# Patient Record
Sex: MALE | Race: WHITE | NOT HISPANIC OR LATINO | Employment: FULL TIME | URBAN - METROPOLITAN AREA
[De-identification: names, ages, dates, MRNs, and addresses within clinical notes are randomized per-mention and may not be internally consistent; named-entity substitution may affect disease eponyms.]

---

## 2021-05-26 ENCOUNTER — RECORDS - HEALTHEAST (OUTPATIENT)
Dept: ADMINISTRATIVE | Facility: CLINIC | Age: 41
End: 2021-05-26

## 2022-04-13 ENCOUNTER — HOSPITAL ENCOUNTER (EMERGENCY)
Facility: HOSPITAL | Age: 42
Discharge: HOME | End: 2022-04-13
Attending: EMERGENCY MEDICINE | Admitting: EMERGENCY MEDICINE
Payer: COMMERCIAL

## 2022-04-13 VITALS
DIASTOLIC BLOOD PRESSURE: 91 MMHG | WEIGHT: 185 LBS | RESPIRATION RATE: 18 BRPM | HEIGHT: 67 IN | OXYGEN SATURATION: 98 % | HEART RATE: 74 BPM | TEMPERATURE: 96.9 F | SYSTOLIC BLOOD PRESSURE: 164 MMHG | BODY MASS INDEX: 29.03 KG/M2

## 2022-04-13 DIAGNOSIS — M25.561 ACUTE PAIN OF RIGHT KNEE: ICD-10-CM

## 2022-04-13 PROBLEM — G47.00 INSOMNIA: Status: ACTIVE | Noted: 2021-05-07

## 2022-04-13 PROBLEM — F10.99 ALCOHOL RELATED DISORDER (H): Status: ACTIVE | Noted: 2019-02-15

## 2022-04-13 PROCEDURE — 250N000013 HC RX MED GY IP 250 OP 250 PS 637: Performed by: EMERGENCY MEDICINE

## 2022-04-13 PROCEDURE — 99283 EMERGENCY DEPT VISIT LOW MDM: CPT

## 2022-04-13 RX ORDER — TRAMADOL HYDROCHLORIDE 50 MG/1
50 TABLET ORAL ONCE
Status: COMPLETED | OUTPATIENT
Start: 2022-04-13 | End: 2022-04-13

## 2022-04-13 RX ORDER — TRAMADOL HYDROCHLORIDE 50 MG/1
50 TABLET ORAL EVERY 6 HOURS PRN
Qty: 10 TABLET | Refills: 0 | Status: SHIPPED | OUTPATIENT
Start: 2022-04-13 | End: 2022-04-16

## 2022-04-13 RX ADMIN — TRAMADOL HYDROCHLORIDE 50 MG: 50 TABLET, COATED ORAL at 21:09

## 2022-04-14 NOTE — DISCHARGE INSTRUCTIONS
Use the knee immobilizer to avoid excessive movement.  Ice and elevate the knee to help with discomfort.  Continue with your prednisone.  After prednisone is completed take ibuprofen.  Take tramadol for additional pain relief.  Follow-up with [facility] in 1 to 2 days if not improving.

## 2022-04-14 NOTE — ED PROVIDER NOTES
"EMERGENCY DEPARTMENT ENCOUNTER      NAME: Can Pop  AGE: 42 year old male  YOB: 1980  MRN: [id number]  EVALUATION DATE & TIME: 4/13/2022  8:42 PM    PCP: No primary care provider on file.    ED PROVIDER: Evans Albrecht M.D.      Chief Complaint   Patient presents with     Knee Pain         FINAL IMPRESSION:  1. Acute pain of right knee          ED COURSE & MEDICAL DECISION MAKING:    Pertinent Labs & Imaging studies reviewed. (See chart for details)  42 year old male presents to the Emergency Department for evaluation of right knee pain.  Describes a severe achiness.  Localizes it posterior aspect of the knee.  States had sudden onset of the pain while trying to get out of a chair.  Relates this is been ongoing for a while.  Reports seeing [facility] a few weeks ago where an MRI was performed.  He was started on prednisone after only \"a few areas of curiosity were noted\".  No reports of ligamentous or cartilaginous injuries.  On exam the knee is without effusion, warmth or erythema.  No joint line tenderness.  No ligamentous laxity.  Slight tenderness posteriorly along the popliteus and along the insertions of the hamstring muscles.  Patella in normal position.  Patient reassured that findings inconsistent with knee dislocation.  Possibility of cartilaginous injury discussed for a while until patient reported his MRI findings making meniscal injury unlikely.  Patient reassured in regards of findings.  Given to tramadol for discomfort.  Crutches for symptomatic relief.  Follow-up with [facility] in 2 to 3 days if not improving.. Patient appears non toxic with stable vitals signs.       8:50 PM I met with the patient for the initial interview and physical examination. Discussed plan for treatment and workup in the ED.    9:24 PM.  Patient declined knee immobilizer.  At the conclusion of the encounter I discussed the results of all of the tests and the disposition. The questions " "were answered and return precautions provided. The patient or family acknowledged understanding and was agreeable with the care plan.       PPE: Provider wore gloves, N95 mask, eye protection, surgical cap.     MEDICATIONS GIVEN IN THE EMERGENCY:  Medications   traMADol (ULTRAM) tablet 50 mg (has no administration in time range)       NEW PRESCRIPTIONS STARTED AT TODAY'S ER VISIT  New Prescriptions    TRAMADOL (ULTRAM) 50 MG TABLET    Take 1 tablet (50 mg) by mouth every 6 hours as needed for severe pain          =================================================================    HPI    Patient information was obtained from: the patient    Use of Intrepreter: N/A       Can Pop is a 42 year old male with a limited medical history of anxiety, depression, and insomnia, who presents to the ED for evaluation of knee pain.    For the past few months, the patient has had a popping sensation in his right knee that causes severe pain. He states that he is usually able to make his knee feel better by flexing and extending it repeatedly until it \"pops back in.\" About 2.5 weeks ago, the patient was seen at [facility] urgent care and an MRI was done. Following this imaging, he was put on prednisone and instructed to follow up in two months. Today, he did not have any issues with his knee until 1.5 hours ago. He was sitting and twisted his knee, when he had a sudden onset of severe pain. He is unable to stand or put weight on his right knee secondary to pain. This pain is located mostly on the back of his knee. In order to alleviate his symptoms, he took two ibuprofen tonight without relief.       REVIEW OF SYSTEMS   Constitutional:  Denies fever, chills  Respiratory:  Denies productive cough or increased work of breathing  Cardiovascular:  Denies chest pain, palpitations  GI:  Denies abdominal pain, nausea, vomiting, or change in bowel or bladder habits   Musculoskeletal:  Positive for right knee pain.  Skin:  Denies rash " "  Neurologic:  Denies focal weakness  All systems negative except as marked.     PAST MEDICAL HISTORY:  History reviewed. No pertinent past medical history.    PAST SURGICAL HISTORY:  Past Surgical History:   Procedure Laterality Date     HAND SURGERY           CURRENT MEDICATIONS:      Current Facility-Administered Medications:      traMADol (ULTRAM) tablet 50 mg, 50 mg, Oral, Once, Evans Albrecht MD    Current Outpatient Medications:      traMADol (ULTRAM) 50 MG tablet, Take 1 tablet (50 mg) by mouth every 6 hours as needed for severe pain, Disp: 10 tablet, Rfl: 0    ALLERGIES:  No Known Allergies    FAMILY HISTORY:  History reviewed. No pertinent family history.    SOCIAL HISTORY:   Social History     Socioeconomic History     Marital status:      Spouse name: None     Number of children: None     Years of education: None     Highest education level: None   Tobacco Use     Smoking status: Never Smoker     Smokeless tobacco: Never Used   Substance and Sexual Activity     Alcohol use: Yes     Comment: social     Drug use: Not Currently       VITALS:  Patient Vitals for the past 24 hrs:   BP Temp Temp src Pulse Resp SpO2 Height Weight   04/13/22 2035 (!) 162/100 96.9  F (36.1  C) Axillary 95 18 97 % 1.702 m (5' 7\") 83.9 kg (185 lb)        PHYSICAL EXAM    Constitutional:  Awake, alert, in mild apparent distress  HENT:  Normocephalic, Atraumatic. Bilateral external ears normal. Oropharynx moist. Nose normal. Neck- Normal range of motion with no guarding, No midline cervical tenderness, Supple, No stridor.   Eyes:  PERRL, EOMI with no signs of entrapment, Conjunctiva normal, No discharge.   Musculoskeletal:  Intact distal pulses, No edema. Good range of motion in all major joints.  Right knee without effusion, without warmth, No joint line tenderness, good flexion and extension, Slight tenderness at insertions of hamstrings.  Integument:  Warm, Dry, No erythema, No rash.   Neurologic:  Alert & oriented, " Normal motor function, Normal sensory function, No focal deficits noted.   Psychiatric:  Affect normal, Judgment normal, Mood normal.       I, Etelvina Teresa, am serving as a scribe to document services personally performed by Evans Albrecht MD, based on my observation and the provider's statements to me. I, Evans Albrecht MD attest that Etelvina Moore is acting in a scribe capacity, has observed my performance of the services and has documented them in accordance with my direction.    Evans Albrecht M.D.  Emergency Medicine  [facility] EMERGENCY DEPARTMENT     Evans Albrecht MD  04/13/22 2124

## 2022-04-14 NOTE — ED TRIAGE NOTES
"Pt presents with right knee pain. Pt states that his knee \"dislocates\" and has done it multiple times. Pt was seen at [facility] and they did scans and an injection. Today while sitting in a chair it happened again. Unable to bear weight.  "

## 2023-12-24 ENCOUNTER — HOSPITAL ENCOUNTER (EMERGENCY)
Facility: CLINIC | Age: 43
Discharge: HOME | End: 2023-12-24
Attending: EMERGENCY MEDICINE | Admitting: EMERGENCY MEDICINE
Payer: COMMERCIAL

## 2023-12-24 VITALS
OXYGEN SATURATION: 96 % | HEIGHT: 66 IN | HEART RATE: 106 BPM | WEIGHT: 185 LBS | SYSTOLIC BLOOD PRESSURE: 155 MMHG | TEMPERATURE: 97.7 F | BODY MASS INDEX: 29.73 KG/M2 | DIASTOLIC BLOOD PRESSURE: 101 MMHG | RESPIRATION RATE: 17 BRPM

## 2023-12-24 DIAGNOSIS — F10.920 ALCOHOLIC INTOXICATION WITHOUT COMPLICATION (H): ICD-10-CM

## 2023-12-24 PROCEDURE — 99282 EMERGENCY DEPT VISIT SF MDM: CPT

## 2023-12-24 ASSESSMENT — ACTIVITIES OF DAILY LIVING (ADL)
ADLS_ACUITY_SCORE: 35
ADLS_ACUITY_SCORE: 35

## 2023-12-24 ASSESSMENT — ENCOUNTER SYMPTOMS: ABDOMINAL PAIN: 0

## 2023-12-24 NOTE — ED NOTES
EMERGENCY DEPARTMENT SIGN OUT NOTE        ED COURSE AND MEDICAL DECISION MAKING  Patient was signed out to me by Dr Keeley Nielsen at 3:12 AM      In brief, Can Pop is a 43 year old male who initially presented with alcohol intoxication. The patient was brought in by [place] Police for disturbance at his girlfriends House. He has no other medical complaints or concerns.     At time of sign out, discharge was pending a ride home.    Additional ED Course Timeline:  0509-I introduced myself to the patient.  Multiple ED staff have had 2 coworkers patient to stay in the emergency department in his room.  0550-patient's ride is in the ED.    I, Lowell Rivera, am serving as a scribe to document services personally performed by Mayelin Archuleta MD based on my observation and the provider's statements to me. I, Mayelin Archuleta MD attest that Lowell Rivera is acting in a scribe capacity, has observed my performance of the services and has documented them in accordance with my direction.      FINAL IMPRESSION    1. Alcoholic intoxication without complication (H24)          Mayelin Archuleta MD  [facility] EMERGENCY ROOM  [address] [facility] [address] [phone]       Mayelin Archuleta MD  12/24/23 4658

## 2023-12-24 NOTE — ED PROVIDER NOTES
EMERGENCY DEPARTMENT ENCOUNTER      NAME: Can Pop  AGE: 43 year old male  YOB: 1980  MRN: [id number]  EVALUATION DATE & TIME: 12/24/2023  1:47 AM    PCP: No Ref-Primary, Physician    ED PROVIDER: Evelyn Nielsen M.D.      Chief Complaint   Patient presents with    Alcohol Intoxication     FINAL IMPRESSION:  1. Alcoholic intoxication without complication (H24)        MEDICAL DECISION MAKING:    Pertinent Labs & Imaging studies reviewed. (See chart for details)  ED Course as of 12/24/23 0305   Sun Dec 24, 2023   0200 Afebrile.  Vital signs here unremarkable.  Patient is coming in with alcohol intoxication.  At his girlfriend's house.  Apparently she and he had both been drinking.  They got into an argument.  Girlfriend called the police.  Police brought him into the hospital.  He has no complaints.  Police did not report there was any trauma or any other injury.  No concerns for mental health.  Exam for patient here intoxicated but cooperative.  Mildly tachycardic.  Denies suicidal or homicidal ideation.    Patient will see if he can find a ride home.  He has no medical complaints at this time.   0303 Patient is clinically sober, ambulating around the emergency department without difficulty.  Is attempting to find a ride.  At this point given the fact that patient clinically sober at this time with steady ambulation I would be in with a left home for him.  He is currently trying to call a ride share raegan.  Plan for discharge with a ride home.         Medical Decision Making    History:  Supplemental history from: Documented in chart, if applicable and Law Enforcement  External Record(s) reviewed: Documented in chart, if applicable.    Work Up:  Chart documentation includes differential considered and any EKGs or imaging independently interpreted by provider, where specified.  In additional to work up documented, I considered the following work up: Documented in chart, if applicable.    External  consultation:  Discussion of management with another provider: Documented in chart, if applicable    Complicating factors:  Care impacted by chronic illness: N/A  Care affected by social determinants of health: N/A    Disposition considerations: Discharge. No recommendations on prescription strength medication(s). See documentation for any additional details.          Critical care: 0 minutes excluding separately billable procedures.  Includes bedside management, time reviewing test results, review of records, discussing the case with staff, documenting the medical record and time spent with family members (or surrogate decision makers) discussing specific treatment issues.          ED COURSE:  1:53 AM I met with the patient, obtained history, performed an initial exam, and discussed options and plan for diagnostics and treatment here in the ED.     The importance of close follow up was discussed. We reviewed warning signs and symptoms, and I instructed Mr. Pop to return to the emergency department immediately if he develops any new or worsening symptoms. I provided additional verbal discharge instructions. Mr. Pop expressed understanding and agreement with this plan of care, his questions were answered, and he was discharged in stable condition.     MEDICATIONS GIVEN IN THE EMERGENCY:  Medications - No data to display    NEW PRESCRIPTIONS STARTED AT TODAY'S ER VISIT:  New Prescriptions    No medications on file          =================================================================    HPI    Patient information was obtained from: Patient    Use of : N/A     Can Pop is a 43 year old male with a history of alcohol related disorder who presents, via police with alcohol intoxication.    The patient reports here with police after patient was reportedly at his girlfriend's house this morning and they got into an argument.  The patient and his girlfriend were both reportedly drinking.  The  "patient has no complaints at this time and states \"I would like to go home and go to bed\".  No other complaints at this time.  No homicidal or suicidal ideas.    REVIEW OF SYSTEMS   Review of Systems   Cardiovascular:  Negative for chest pain.   Gastrointestinal:  Negative for abdominal pain.   Psychiatric/Behavioral:  Negative for suicidal ideas.         Negative for homicidal ideas   All other systems reviewed and are negative.    PAST MEDICAL HISTORY:  No past medical history on file.    PAST SURGICAL HISTORY:  Past Surgical History:   Procedure Laterality Date    HAND SURGERY         CURRENT MEDICATIONS:    No current facility-administered medications for this encounter.  No current outpatient medications on file.    ALLERGIES:  No Known Allergies    FAMILY HISTORY:  No family history on file.    SOCIAL HISTORY:   Social History     Socioeconomic History    Marital status: Single   Tobacco Use    Smoking status: Never    Smokeless tobacco: Never   Substance and Sexual Activity    Alcohol use: Yes     Comment: social    Drug use: Not Currently       PHYSICAL EXAM:    Vitals: BP (!) 155/101   Pulse 106   Temp 97.7  F (36.5  C) (Oral)   Resp 17   Ht 1.676 m (5' 6\")   Wt 83.9 kg (185 lb)   SpO2 96%   BMI 29.86 kg/m     General:. Alert and interactive, comfortable appearing. patient here intoxicated but cooperative.   HENT: Oropharynx without erythema or exudates. MMM.  TMs clear bilaterally.  Eyes: Pupils mid-sized and equally reactive.   Neck: Full AROM.  No midline tenderness to palpation.  Cardiovascular: Mildly tachycardic, Regular  rhythm. Peripheral pulses 2+ bilaterally.  Chest/Pulmonary: Normal work of breathing. Lung sounds clear and equal throughout, no wheezes or crackles. No chest wall tenderness or deformities.  Abdomen: Soft, nondistended. Nontender without guarding or rebound.  Back/Spine: No CVA or midline tenderness.  Extremities: Normal ROM of all major joints. No lower extremity edema. "   Skin: Warm and dry. Normal skin color.   Neuro: Speech clear. CNs grossly intact. Moves all extremities appropriately. Strength and sensation grossly intact to all extremities.   Psych: Normal affect/mood, cooperative, memory appropriate. Denies suicidal or homicidal ideation.     LAB:  All pertinent labs reviewed and interpreted.  Labs Ordered and Resulted from Time of ED Arrival to Time of ED Departure - No data to display    RADIOLOGY:  No orders to display       EKG:  See MDM    PROCEDURES:   None    I, Can Michel, am serving as a scribe to document services personally performed by Dr. Evelyn Nielsen  based on my observation and the provider's statements to me. I, Evelyn Nielsen MD attest that Can Michel is acting in a scribe capacity, has observed my performance of the services and has documented them in accordance with my direction.      Evelyn Nielsen M.D.  Emergency Medicine  [facility] EMERGENCY ROOM  [address] [id number] [phone]  Dept: [phone]      Evelyn Nielsen MD  12/24/23 0305

## 2023-12-24 NOTE — ED NOTES
Patient still insists on leaving and walking home. MD, RN, and writer have told him multiple times we are waiting for cab to be his sober ride home.Writer is with patient to ensure safety.

## 2023-12-24 NOTE — ED TRIAGE NOTES
Patient BIB [place] Police for disturbance at someones house.  Patient reports he was at his girlfriends house.  Girlfriend called the police.  Patient is heavily intoxicated.  Unable to find someone to care for him at this time.     Triage Assessment (Adult)       Row Name 12/24/23 0149          Triage Assessment    Airway WDL WDL        Respiratory WDL    Respiratory WDL WDL        Skin Circulation/Temperature WDL    Skin Circulation/Temperature WDL WDL        Cardiac WDL    Cardiac WDL --  hypertensive

## 2023-12-24 NOTE — ED NOTES
Writer called Thomas, for ride home. MD verified. Writer at bedside to ensure safety until ride comes.

## 2025-08-08 ENCOUNTER — LAB REQUISITION (OUTPATIENT)
Dept: LAB | Facility: CLINIC | Age: 45
End: 2025-08-08

## 2025-08-08 DIAGNOSIS — Z13.220 ENCOUNTER FOR SCREENING FOR LIPOID DISORDERS: ICD-10-CM

## 2025-08-08 DIAGNOSIS — Z13.1 ENCOUNTER FOR SCREENING FOR DIABETES MELLITUS: ICD-10-CM

## 2025-08-08 DIAGNOSIS — R53.83 OTHER FATIGUE: ICD-10-CM

## 2025-08-08 PROCEDURE — 84270 ASSAY OF SEX HORMONE GLOBUL: CPT | Performed by: NURSE PRACTITIONER

## 2025-08-08 PROCEDURE — 84403 ASSAY OF TOTAL TESTOSTERONE: CPT | Performed by: NURSE PRACTITIONER

## 2025-08-08 PROCEDURE — 84443 ASSAY THYROID STIM HORMONE: CPT | Performed by: NURSE PRACTITIONER

## 2025-08-08 PROCEDURE — 80061 LIPID PANEL: CPT | Performed by: NURSE PRACTITIONER

## 2025-08-08 PROCEDURE — 82310 ASSAY OF CALCIUM: CPT | Performed by: NURSE PRACTITIONER

## 2025-08-09 LAB
ALBUMIN SERPL BCG-MCNC: 4.4 G/DL (ref 3.5–5.2)
ALP SERPL-CCNC: 60 U/L (ref 40–150)
ALT SERPL W P-5'-P-CCNC: 35 U/L (ref 0–70)
ANION GAP SERPL CALCULATED.3IONS-SCNC: 12 MMOL/L (ref 7–15)
AST SERPL W P-5'-P-CCNC: 33 U/L (ref 0–45)
BILIRUB SERPL-MCNC: 0.3 MG/DL
BUN SERPL-MCNC: 11.7 MG/DL (ref 6–20)
CALCIUM SERPL-MCNC: 9.3 MG/DL (ref 8.8–10.4)
CHLORIDE SERPL-SCNC: 105 MMOL/L (ref 98–107)
CHOLEST SERPL-MCNC: 207 MG/DL
CREAT SERPL-MCNC: 0.76 MG/DL (ref 0.67–1.17)
EGFRCR SERPLBLD CKD-EPI 2021: >90 ML/MIN/1.73M2
FASTING STATUS PATIENT QL REPORTED: ABNORMAL
FASTING STATUS PATIENT QL REPORTED: NORMAL
GLUCOSE SERPL-MCNC: 95 MG/DL (ref 70–99)
HCO3 SERPL-SCNC: 25 MMOL/L (ref 22–29)
HDLC SERPL-MCNC: 74 MG/DL
LDLC SERPL CALC-MCNC: 113 MG/DL
NONHDLC SERPL-MCNC: 133 MG/DL
POTASSIUM SERPL-SCNC: 4.2 MMOL/L (ref 3.4–5.3)
PROT SERPL-MCNC: 7.3 G/DL (ref 6.4–8.3)
SHBG SERPL-SCNC: 33 NMOL/L (ref 11–80)
SODIUM SERPL-SCNC: 142 MMOL/L (ref 135–145)
TRIGL SERPL-MCNC: 101 MG/DL
TSH SERPL DL<=0.005 MIU/L-ACNC: 2.17 UIU/ML (ref 0.3–4.2)

## 2025-08-13 LAB
TESTOST FREE SERPL-MCNC: 6.94 NG/DL
TESTOST SERPL-MCNC: 347 NG/DL (ref 240–950)